# Patient Record
Sex: FEMALE | Race: WHITE | NOT HISPANIC OR LATINO | URBAN - METROPOLITAN AREA
[De-identification: names, ages, dates, MRNs, and addresses within clinical notes are randomized per-mention and may not be internally consistent; named-entity substitution may affect disease eponyms.]

---

## 2018-03-28 NOTE — PROCEDURE NOTE: SURGICAL
<p>Prior to commencing surgery patient identification, surgical procedure, site, and side were confirmed by Dr. Paul Yen. Following topical proparacaine anesthesia, the patient was positioned at the YAG laser, a contact lens coupled to the cornea with methylcellulose and an axial posterior capsulotomy performed without complication using 2.9 Mj x 31. Excess methylcellulose was washed from the eye, one drop of Alphagan was instilled and the patient returned to the holding area having tolerated the procedure well and without complication. </p><p>MRN [id number]</p>

## 2019-02-05 NOTE — PATIENT DISCUSSION
"""Patient wanted prescription in hand today. Advised patient to wait 1-2 weeks until ordering.  Spoke with patient on 4/4/19 patient would like to have prescription for old CL. +1.25sph OD

## 2019-07-31 NOTE — PATIENT DISCUSSION
"""Trials given to patient today. Patient to wear lenses into next appointment for contact lens check in 1-2 weeks.  Instructed patient to discontinue contact lens wear and call office immediately if pain/discomfort

## 2019-07-31 NOTE — PATIENT DISCUSSION
"""Ordering further testing due to large c/d ratio. Will monitor.  Will have patient come back in four ""

## 2019-08-09 NOTE — PATIENT DISCUSSION
"""Finalized CL prescription. Advised patient if she has any trouble to give our office a call. Patient given final contact lens prescription.  8/9/2019"""

## 2020-01-24 NOTE — PATIENT DISCUSSION
"""Discussed her left eye qualifies for cataract surgery but she wants to hold off for now.  Will ""

## 2021-01-08 ENCOUNTER — NEW PATIENT (OUTPATIENT)
Dept: URBAN - METROPOLITAN AREA CLINIC 33 | Facility: CLINIC | Age: 66
End: 2021-01-08

## 2021-01-08 VITALS
DIASTOLIC BLOOD PRESSURE: 68 MMHG | SYSTOLIC BLOOD PRESSURE: 110 MMHG | HEIGHT: 62 IN | BODY MASS INDEX: 23 KG/M2 | HEART RATE: 67 BPM | WEIGHT: 125 LBS

## 2021-01-08 DIAGNOSIS — H02.831: ICD-10-CM

## 2021-01-08 DIAGNOSIS — H02.834: ICD-10-CM

## 2021-01-08 DIAGNOSIS — H43.812: ICD-10-CM

## 2021-01-08 DIAGNOSIS — H35.373: ICD-10-CM

## 2021-01-08 DIAGNOSIS — H25.13: ICD-10-CM

## 2021-01-08 DIAGNOSIS — H04.123: ICD-10-CM

## 2021-01-08 PROCEDURE — 92004 COMPRE OPH EXAM NEW PT 1/>: CPT

## 2021-01-08 PROCEDURE — 92134 CPTRZ OPH DX IMG PST SGM RTA: CPT

## 2021-01-08 PROCEDURE — 92250 FUNDUS PHOTOGRAPHY W/I&R: CPT

## 2021-01-08 ASSESSMENT — TONOMETRY
OS_IOP_MMHG: 11
OD_IOP_MMHG: 10

## 2021-01-08 ASSESSMENT — VISUAL ACUITY
OD_SC: 20/25+1
OS_SC: 20/20-2

## 2021-07-19 NOTE — PATIENT DISCUSSION
Discussed removal of lenses and prompt return to clinic with any pain, redness, or excessive discomfort. Advised against sleeping in lenses or using beyond time frame of approval and risk of infection or even possible loss of vision with doing so. Discussed proper contact lens hygiene such as cases tossed after 3 months of use, no topping off of solution, and no tap water or use of contact lenses with hot tubs.

## 2022-03-17 ENCOUNTER — FOLLOW UP (OUTPATIENT)
Dept: URBAN - METROPOLITAN AREA CLINIC 33 | Facility: CLINIC | Age: 67
End: 2022-03-17

## 2022-03-17 VITALS — BODY MASS INDEX: 23 KG/M2 | HEIGHT: 62 IN | WEIGHT: 125 LBS

## 2022-03-17 DIAGNOSIS — H43.812: ICD-10-CM

## 2022-03-17 DIAGNOSIS — H04.123: ICD-10-CM

## 2022-03-17 DIAGNOSIS — H35.373: ICD-10-CM

## 2022-03-17 PROCEDURE — 92012 INTRM OPH EXAM EST PATIENT: CPT

## 2022-03-17 PROCEDURE — 92250 FUNDUS PHOTOGRAPHY W/I&R: CPT

## 2022-03-17 PROCEDURE — 92134 CPTRZ OPH DX IMG PST SGM RTA: CPT

## 2022-03-17 RX ORDER — PILOCARPINE HYDROCHLORIDE 12.5 MG/ML: 1 SOLUTION/ DROPS OPHTHALMIC ONCE A DAY

## 2022-03-17 ASSESSMENT — VISUAL ACUITY
OD_SC: 20/20
OS_SC: 20/20

## 2022-03-17 ASSESSMENT — TONOMETRY
OD_IOP_MMHG: 11
OS_IOP_MMHG: 11

## 2023-04-27 ENCOUNTER — FOLLOW UP (OUTPATIENT)
Dept: URBAN - METROPOLITAN AREA CLINIC 33 | Facility: CLINIC | Age: 68
End: 2023-04-27

## 2023-04-27 DIAGNOSIS — H35.373: ICD-10-CM

## 2023-04-27 DIAGNOSIS — H04.123: ICD-10-CM

## 2023-04-27 DIAGNOSIS — H43.812: ICD-10-CM

## 2023-04-27 PROCEDURE — 92014 COMPRE OPH EXAM EST PT 1/>: CPT

## 2023-04-27 PROCEDURE — 92134 CPTRZ OPH DX IMG PST SGM RTA: CPT

## 2023-04-27 PROCEDURE — 92250 FUNDUS PHOTOGRAPHY W/I&R: CPT

## 2023-04-27 ASSESSMENT — VISUAL ACUITY
OD_SC: 20/25-2
OS_SC: 20/15-2

## 2023-04-27 ASSESSMENT — TONOMETRY
OD_IOP_MMHG: 12
OS_IOP_MMHG: 13

## 2024-08-15 ENCOUNTER — COMPREHENSIVE EXAM (OUTPATIENT)
Dept: URBAN - METROPOLITAN AREA CLINIC 33 | Facility: CLINIC | Age: 69
End: 2024-08-15

## 2024-08-15 VITALS — BODY MASS INDEX: 23 KG/M2 | HEIGHT: 62 IN | WEIGHT: 125 LBS

## 2024-08-15 DIAGNOSIS — H43.812: ICD-10-CM

## 2024-08-15 DIAGNOSIS — H35.373: ICD-10-CM

## 2024-08-15 DIAGNOSIS — H04.123: ICD-10-CM

## 2024-08-15 DIAGNOSIS — H02.831: ICD-10-CM

## 2024-08-15 DIAGNOSIS — H02.834: ICD-10-CM

## 2024-08-15 DIAGNOSIS — H25.13: ICD-10-CM

## 2024-08-15 PROCEDURE — 92134 CPTRZ OPH DX IMG PST SGM RTA: CPT

## 2024-08-15 PROCEDURE — 92014 COMPRE OPH EXAM EST PT 1/>: CPT

## 2024-08-15 PROCEDURE — 92250 FUNDUS PHOTOGRAPHY W/I&R: CPT | Mod: 59

## 2024-08-15 ASSESSMENT — TONOMETRY
OS_IOP_MMHG: 15
OD_IOP_MMHG: 16

## 2024-08-15 ASSESSMENT — VISUAL ACUITY
OD_SC: 20/25+2
OS_SC: 20/20+1